# Patient Record
Sex: MALE | Race: WHITE
[De-identification: names, ages, dates, MRNs, and addresses within clinical notes are randomized per-mention and may not be internally consistent; named-entity substitution may affect disease eponyms.]

---

## 2018-11-12 ENCOUNTER — HOSPITAL ENCOUNTER (OUTPATIENT)
Dept: HOSPITAL 47 - LABPAT | Age: 38
End: 2018-11-12
Attending: ORTHOPAEDIC SURGERY
Payer: COMMERCIAL

## 2018-11-12 DIAGNOSIS — Z01.812: Primary | ICD-10-CM

## 2018-11-12 PROCEDURE — 86901 BLOOD TYPING SEROLOGIC RH(D): CPT

## 2018-11-12 PROCEDURE — 87070 CULTURE OTHR SPECIMN AEROBIC: CPT

## 2018-11-12 PROCEDURE — 86900 BLOOD TYPING SEROLOGIC ABO: CPT

## 2018-11-12 PROCEDURE — 86850 RBC ANTIBODY SCREEN: CPT

## 2018-11-18 NOTE — HP
HISTORY AND PHYSICAL



SURGERY:

11/19/2018



Clyde Martinez is a 37-year-old patient seen with symptomatic left hip osteoarthritis.

After treatment options were discussed with him, he elected to proceed with left total

hip arthroplasty.  Consent regarding the procedure was obtained.  Medical clearance was

provided by Dr. Weber.



PAST MEDICAL HISTORY:

Hypertension, non-insulin-dependent diabetes.



PAST SURGICAL HISTORY:

Left hip arthroscopy.



MEDICATIONS:

Glipizide, lisinopril, ibuprofen.



ALLERGIES:

None.



SOCIAL HISTORY:

The patient denies current tobacco use.



PHYSICAL EXAMINATION:

Evaluation of the left hip: He has very limited range of motion with severe pain.

Positive hip impingement sign.  Straight leg raise negative.  Distal neurovascular exam

is intact.



RADIOGRAPHS:

Radiographs of the left hip reveal severe osteoarthritic changes.



IMPRESSION:

1. Left hip osteoarthritis.

2. Hypertension.

3. Non-insulin-dependent diabetes.



PLAN:

Direct anterior left total hip arthroplasty.





MMODL / IJN: [id number] / Job#: [id number]

## 2018-11-19 ENCOUNTER — HOSPITAL ENCOUNTER (INPATIENT)
Dept: HOSPITAL 47 - 2ORMAIN | Age: 38
LOS: 1 days | Discharge: HOME HEALTH | DRG: 470 | End: 2018-11-20
Attending: ORTHOPAEDIC SURGERY | Admitting: ORTHOPAEDIC SURGERY
Payer: COMMERCIAL

## 2018-11-19 VITALS — BODY MASS INDEX: 43.4 KG/M2

## 2018-11-19 DIAGNOSIS — Z79.899: ICD-10-CM

## 2018-11-19 DIAGNOSIS — Z79.4: ICD-10-CM

## 2018-11-19 DIAGNOSIS — Z87.891: ICD-10-CM

## 2018-11-19 DIAGNOSIS — E66.01: ICD-10-CM

## 2018-11-19 DIAGNOSIS — G47.30: ICD-10-CM

## 2018-11-19 DIAGNOSIS — E11.9: ICD-10-CM

## 2018-11-19 DIAGNOSIS — E78.5: ICD-10-CM

## 2018-11-19 DIAGNOSIS — K21.9: ICD-10-CM

## 2018-11-19 DIAGNOSIS — Z99.89: ICD-10-CM

## 2018-11-19 DIAGNOSIS — Z86.69: ICD-10-CM

## 2018-11-19 DIAGNOSIS — I10: ICD-10-CM

## 2018-11-19 DIAGNOSIS — M16.12: Primary | ICD-10-CM

## 2018-11-19 DIAGNOSIS — E78.1: ICD-10-CM

## 2018-11-19 LAB
GLUCOSE BLD-MCNC: 109 MG/DL (ref 75–99)
GLUCOSE BLD-MCNC: 162 MG/DL (ref 75–99)
GLUCOSE BLD-MCNC: 181 MG/DL (ref 75–99)

## 2018-11-19 PROCEDURE — 86900 BLOOD TYPING SEROLOGIC ABO: CPT

## 2018-11-19 PROCEDURE — 86901 BLOOD TYPING SEROLOGIC RH(D): CPT

## 2018-11-19 PROCEDURE — 86850 RBC ANTIBODY SCREEN: CPT

## 2018-11-19 PROCEDURE — 73501 X-RAY EXAM HIP UNI 1 VIEW: CPT

## 2018-11-19 PROCEDURE — 83036 HEMOGLOBIN GLYCOSYLATED A1C: CPT

## 2018-11-19 PROCEDURE — 0SRB04A REPLACEMENT OF LEFT HIP JOINT WITH CERAMIC ON POLYETHYLENE SYNTHETIC SUBSTITUTE, UNCEMENTED, OPEN APPROACH: ICD-10-PCS

## 2018-11-19 PROCEDURE — 88300 SURGICAL PATH GROSS: CPT

## 2018-11-19 PROCEDURE — 80053 COMPREHEN METABOLIC PANEL: CPT

## 2018-11-19 PROCEDURE — 85025 COMPLETE CBC W/AUTO DIFF WBC: CPT

## 2018-11-19 RX ADMIN — POTASSIUM CHLORIDE SCH MLS: 14.9 INJECTION, SOLUTION INTRAVENOUS at 08:50

## 2018-11-19 RX ADMIN — HYDROMORPHONE HYDROCHLORIDE PRN MG: 1 INJECTION, SOLUTION INTRAMUSCULAR; INTRAVENOUS; SUBCUTANEOUS at 18:36

## 2018-11-19 RX ADMIN — HYDROCODONE BITARTRATE AND ACETAMINOPHEN PRN EACH: 7.5; 325 TABLET ORAL at 14:20

## 2018-11-19 RX ADMIN — HYDROMORPHONE HYDROCHLORIDE PRN MG: 1 INJECTION, SOLUTION INTRAMUSCULAR; INTRAVENOUS; SUBCUTANEOUS at 15:07

## 2018-11-19 RX ADMIN — HYDROCODONE BITARTRATE AND ACETAMINOPHEN PRN EACH: 7.5; 325 TABLET ORAL at 21:04

## 2018-11-19 RX ADMIN — POTASSIUM CHLORIDE SCH: 14.9 INJECTION, SOLUTION INTRAVENOUS at 14:47

## 2018-11-19 RX ADMIN — INSULIN ASPART SCH UNIT: 100 INJECTION, SOLUTION INTRAVENOUS; SUBCUTANEOUS at 17:43

## 2018-11-19 NOTE — P.CONS
History of Present Illness





- Reason for Consult


Consult date: 11/19/18


Medical management


Requesting physician: Antoine Prabhakar





- Chief Complaint


Left hip total arthroplasty





- History of Present Illness





This is a 37-year-old male, patient of Dr. Weber.  He has a known past 

medical history of diabetes, hypertension, arthritis or arthritis and acid 

reflux.  Patient presents to the hospital for an elective left total hip 

arthroplasty.  He tolerated surgery well no complications reported.  Estimated 

blood loss 300 mL.  We will been consulted for medical management.  Patient 

lying in bed comfortably.  Denies any chest pain or shortness of breath.  

Denies any nausea or vomiting.  Denies any bowel movement changes or urinary 

symptoms.  Denies any fever or chills or sweats.  He is complaining of some 

left hip pain and is due for pain medication.





Review of Systems





Please refer to HPI otherwise unremarkable





Past Medical History


Past Medical History: Diabetes Mellitus, GERD/Reflux, Hyperlipidemia, 

Osteoarthritis (OA), Sleep Apnea/CPAP/BIPAP


Additional Past Medical History / Comment(s): Hx. Bell's Palsy July 2016-no 

residual, doesn't use anything for sleep apnea


History of Any Multi-Drug Resistant Organisms: None Reported


Past Surgical History: Orthopedic Surgery


Additional Past Surgical History / Comment(s): vasectomy 9-2016,rt achilles 

tendon repair, arthroscopy left hip


Past Anesthesia/Blood Transfusion Reactions: Motion Sickness


Additional Past Anesthesia/Blood Transfusion Reaction / Comm: no hx blood 

transfusion


Smoking Status: Former smoker





- Past Family History


  ** Mother


Family Medical History: No Reported History





  ** Father


Family Medical History: No Reported History





Medications and Allergies


 Home Medications











 Medication  Instructions  Recorded  Confirmed  Type


 


DULoxetine HCL [Cymbalta] 20 mg PO HS 11/14/18 11/19/18 History


 


Ibuprofen [Motrin] 800 mg PO Q6H PRN 11/14/18 11/19/18 History


 


Insulin Degludec/Liraglutide 26 unit SQ QAM 11/14/18 11/19/18 History





[Xultophy 100 Unit-3.6MG/ml Pen]    


 


Lisinopril [Zestril] 10 mg PO DAILY 11/14/18 11/19/18 History


 


Omeprazole [PriLOSEC] 20 mg PO AC-BRKFST PRN 11/14/18 11/19/18 History


 


Simvastatin [Zocor] 20 mg PO HS 11/14/18 11/19/18 History


 


glipiZIDE [Glucotrol] 5 mg PO AC-BID 11/14/18 11/19/18 History











 Allergies











Allergy/AdvReac Type Severity Reaction Status Date / Time


 


No Known Allergies Allergy   Verified 11/19/18 13:26














Physical Exam


Vitals: 


 Vital Signs











  Temp Pulse Pulse Resp BP Pulse Ox


 


 11/19/18 13:30   62   18  119/60  94 L


 


 11/19/18 13:15   57 L   16  118/59  98


 


 11/19/18 13:00   62   18  114/69  94 L


 


 11/19/18 12:45   70   18  115/62  93 L


 


 11/19/18 12:32  97.7 F  63   18  125/67  97


 


 11/19/18 08:29  97.4 F L   71  16  127/74  98








 Intake and Output











 11/18/18 11/19/18 11/19/18





 22:59 06:59 14:59


 


Intake Total   1601


 


Output Total   300


 


Balance   1301


 


Intake:   


 


  IV   1601


 


Output:   


 


  Estimated Blood Loss   300














Head normocephalic


Neck supple


Lungs clear to auscultation bilaterally no wheezing or crackles


Heart regular rate and rhythm S1-S2, no rub or gallop


Abdomen is soft nontender nondistended positive bowel sounds no 

hepatosplenomegaly


Extremities no edema.  Left hip dressing clean dry and intact.  Ice pack in 

place ZACH hose and SCDs


Neuro alert and orientated to 3





Results


Labs: 


 Abnormal Lab Results - Last 24 Hours (Table)











  11/19/18 11/19/18 Range/Units





  08:45 13:32 


 


POC Glucose (mg/dL)  109 H  162 H  (75-99)  mg/dL














Assessment and Plan


Assessment: 





1.  Left hip osteoarthritis status post direct anterior left total hip 

arthroplasty.  Estimated bolus 300 mL.  Continue pain medication for PE 

protocol.  Patient currently on Lovenox for DVT prophylaxis





2.  Diabetes mellitus type 2: Continue glipizide.  Resume xultophy.  This is a 

nonformulary medication.  Patient can't bring from home.  We'll add NovoLog 

sliding scale coverage.  Check A1c





3.  Essential hypertension: Continue lisinopril 10 mg daily hold for systolic 

blood pressure less than 120





4.  GERD: Continue Pepcid





Thank you for this consultation.  We will continue to follow along during 

patient's hospitalization.  Check routine labs in the morning CBC and CMP








Time with Patient: Greater than 30 (Greater than 60% of the total time spent in 

counseling and coordination of care.I performed an examination of the patient 

and discussed their management with the physician Assistant.  I have reviewed 

the Physician Assistant's notes and agree with the documented findings and plan 

of care)

## 2018-11-19 NOTE — XR
EXAMINATION TYPE: 

 

XR Hip Limited LT, 

FL guidance operating room

 

DATE OF EXAM: 11/19/2018

 

FINDINGS:

Single frontal view showing appearance after left hip total arthroplasty.

 

FLUOROSCOPY

 

Fluoroscopy time of 38 seconds was used during anterior left hip replacement.  1 image/s document/s t
he procedure.

 

 

 

IMPRESSION:

Intraoperative fluoroscopy as above.

## 2018-11-19 NOTE — P.OP
Date of Procedure: 11/19/18


Preoperative Diagnosis: 


Left hip osteoarthritis


Postoperative Diagnosis: 


Left hip osteoarthritis


Procedure(s) Performed: 


Direct anterior left total hip arthroplasty


Implants: 


1.  Depuy Corail KA size 12 with collar press-fit femoral stem


2.  Depuy pinnacle 60 mm press-fit acetabular shell


3.  Depuy pinnacle polyethylene acetabular liner 60 mm OD 36 mm ID neutral


4.  Biolox delta ceramic femoral head 36 mm +5





Anesthesia: local, spinal


Surgeon: Antoine Prabhakar


Assistant #1: Lance Ray


Estimated Blood Loss (ml): 300


Pathology: other (Femoral head)


Condition: stable


Disposition: PACU


Indications for Procedure: 


37-year-old patient seen with symptomatic left hip osteoarthritis.  After 

treatment options were discussed, he elected to proceed with total hip 

arthroplasty.


Operative Findings: 


See description of procedure


Description of Procedure: 


The patient was taken to the operative suite.  Patient underwent a spinal 

anesthetic by the department of anesthesia.  Patient was then transferred to 

the [place] table.  Patient was given preoperative IV antibiotics and TXA.  Both 

lower extremities were placed in standard leg spars.  The hip was then prepped 

and draped in the normal sterile orthopedic fashion.  A standard anterior 

incision was made beginning 3 cm lateral and 1 cm distal to the ASIS extending 

10 cm.  Dissection was then carried down through the subcutaneous soft tissues 

down to the fascia overlying the tensor fascia lashell.  An incision was now made 

through the fascia.  Careful dissection was taken down exposing the tensor 

fascia lashell muscle.  A Cobra retractor was now placed along the medial femoral 

neck and a second one along the lateral femoral neck.  The venous circumflex 

vessels were now identified, cauterized and clipped.  We identified the 

anterior hip capsule.  An incision was made through the hip capsule along the 

lateral border.  I performed a partial anterior capsulectomy.  Retractors were 

now placed around the femoral neck itself. A femoral neck cut was now made with 

a sagittal saw.  It was completed with an osteotome at the lateral neck area.  

The femoral head was now removed without difficulty.  The extremity was now 

rotated to 45 of external rotation.  It was locked in position.  Residual 

labrum was now debrided out.  Serial reaming was performed of the acetabulum 

while Osito ZAPATA assisted holding an anterior retractor for exposure.  Once 

we reached the appropriate size and a trial was position and fit nicely.  The 

appropriate size was now chosen opened and made available.  It was introduced 

into the acetabulum without difficulty.  The C-arm/fluoroscopy was now brought 

into the operative field.  We made sure we had a true AP pelvic view.  We now 

under direct C-arm/fluoroscopy introduced into the acetabular component with 

appropriate version and inclination.  I held the cup in appropriate position 

well Osito ZAPATA used a mallet to seat the acetabular component.  I noted 

the component now to be well seated and stable.  Acetabular cup introducer was 

removed.  The C-arm was pulled back.  An appropriate liner was introduced and 

clicked into position.  It was felt to be stable.  At this point retractors 

were removed.  The extremity was now placed into 120 external rotation with no 

traction.  The leg was now dropped to the ground and adducted.  Appropriate 

retractors were now positioned along the proximal femur.  We also placed our 

femoral look into position.  Additional capsular releasing was performed to 

gain access to the proximal femur.  We now used a box osteotome.  A canal 

finder was now utilized.  Serial broaching was now performed with the 

assistance of Osito ZAPATA tapping the broaches down with a mallet while held 

the broach in appropriate rotation and position.  This was done  until we 

reached the appropriate size with good overall rotational stability.  

Appropriate calcar planing was performed.  A trial head/neck was placed into 

position.  The hip was now reduced.  The C-arm/fluoroscopy was brought back 

into the operative field.  A spot film was obtained of the nonoperative hip.  A 

spot film was obtained of the trial components.  Overlays were performed, we 

noted good overall alignment and positioning for determining leg length.  The C-

arm/fluoroscopy was pulled back.  Retractors were repositioned and the hip was 

dislocated.  The leg was again taken down to the ground and adducted.  

Appropriate retractors were repositioned as well as the femoral hook.  All 

trial components were removed.  The femoral implant was opened along with the 

femoral head.  The femoral implant was introduced on the appropriate handle 

into our pre-broached area.  I held the component position well Osito ZAPATA 

used a mallet to seat the femoral component.  The femoral component was now 

noted to be well seated and stable..  The femoral head was introduced with good 

positioning and fixation noted.  Retractors were now removed.  The hip was now 

reduced.  There appeared be good positioning of the hip confirmed on 

intraoperative fluoroscopy.  Spot films were obtained to document this.  A 

second gram of TXA was given.  The deep and superficial soft tissues were 

infiltrated with local analgesic.  Bipolar cautery had been utilized 

intermittently through the procedure for hemostasis.  The wound was irrigated 

copiously with pulse lavage mechanical irrigation.  The fascia was repaired 

with Vicryl suture.  The subcutaneous soft tissues were repaired in layers with 

Vicryl suture.  The skin was approximated with pernio/Dermabond. Sterile 

dressings were applied.  Patient was then awakened, transferred to a bed and 

taken to recovery in stable condition.  Osito ZAPATA assisted with the 

complex procedure.

## 2018-11-20 VITALS — TEMPERATURE: 98 F

## 2018-11-20 VITALS — RESPIRATION RATE: 16 BRPM | DIASTOLIC BLOOD PRESSURE: 60 MMHG | HEART RATE: 76 BPM | SYSTOLIC BLOOD PRESSURE: 131 MMHG

## 2018-11-20 LAB
ALBUMIN SERPL-MCNC: 3.3 G/DL (ref 3.5–5)
ALP SERPL-CCNC: 26 U/L (ref 38–126)
ALT SERPL-CCNC: 49 U/L (ref 21–72)
ANION GAP SERPL CALC-SCNC: 7 MMOL/L
AST SERPL-CCNC: 34 U/L (ref 17–59)
BASOPHILS # BLD AUTO: 0 K/UL (ref 0–0.2)
BASOPHILS NFR BLD AUTO: 0 %
BUN SERPL-SCNC: 12 MG/DL (ref 9–20)
CALCIUM SPEC-MCNC: 8.3 MG/DL (ref 8.4–10.2)
CHLORIDE SERPL-SCNC: 105 MMOL/L (ref 98–107)
CO2 SERPL-SCNC: 25 MMOL/L (ref 22–30)
EOSINOPHIL # BLD AUTO: 0.1 K/UL (ref 0–0.7)
EOSINOPHIL NFR BLD AUTO: 1 %
ERYTHROCYTE [DISTWIDTH] IN BLOOD BY AUTOMATED COUNT: 4.04 M/UL (ref 4.3–5.9)
ERYTHROCYTE [DISTWIDTH] IN BLOOD: 12.3 % (ref 11.5–15.5)
GLUCOSE BLD-MCNC: 109 MG/DL (ref 75–99)
GLUCOSE BLD-MCNC: 122 MG/DL (ref 75–99)
GLUCOSE BLD-MCNC: 140 MG/DL (ref 75–99)
GLUCOSE SERPL-MCNC: 113 MG/DL (ref 74–99)
HBA1C MFR BLD: 6.2 % (ref 4–6)
HCT VFR BLD AUTO: 36.5 % (ref 39–53)
HGB BLD-MCNC: 12.3 GM/DL (ref 13–17.5)
LYMPHOCYTES # SPEC AUTO: 1.6 K/UL (ref 1–4.8)
LYMPHOCYTES NFR SPEC AUTO: 16 %
MCH RBC QN AUTO: 30.4 PG (ref 25–35)
MCHC RBC AUTO-ENTMCNC: 33.6 G/DL (ref 31–37)
MCV RBC AUTO: 90.4 FL (ref 80–100)
MONOCYTES # BLD AUTO: 0.7 K/UL (ref 0–1)
MONOCYTES NFR BLD AUTO: 7 %
NEUTROPHILS # BLD AUTO: 7.3 K/UL (ref 1.3–7.7)
NEUTROPHILS NFR BLD AUTO: 74 %
PLATELET # BLD AUTO: 287 K/UL (ref 150–450)
POTASSIUM SERPL-SCNC: 4.3 MMOL/L (ref 3.5–5.1)
PROT SERPL-MCNC: 5.8 G/DL (ref 6.3–8.2)
SODIUM SERPL-SCNC: 137 MMOL/L (ref 137–145)
WBC # BLD AUTO: 9.9 K/UL (ref 3.8–10.6)

## 2018-11-20 RX ADMIN — HYDROCODONE BITARTRATE AND ACETAMINOPHEN PRN EACH: 7.5; 325 TABLET ORAL at 14:13

## 2018-11-20 RX ADMIN — INSULIN ASPART SCH: 100 INJECTION, SOLUTION INTRAVENOUS; SUBCUTANEOUS at 00:40

## 2018-11-20 RX ADMIN — HYDROCODONE BITARTRATE AND ACETAMINOPHEN PRN EACH: 7.5; 325 TABLET ORAL at 09:10

## 2018-11-20 RX ADMIN — POTASSIUM CHLORIDE SCH: 14.9 INJECTION, SOLUTION INTRAVENOUS at 07:10

## 2018-11-20 RX ADMIN — POTASSIUM CHLORIDE SCH: 14.9 INJECTION, SOLUTION INTRAVENOUS at 12:11

## 2018-11-20 RX ADMIN — INSULIN ASPART SCH: 100 INJECTION, SOLUTION INTRAVENOUS; SUBCUTANEOUS at 08:19

## 2018-11-20 RX ADMIN — INSULIN ASPART SCH: 100 INJECTION, SOLUTION INTRAVENOUS; SUBCUTANEOUS at 12:11

## 2018-11-20 RX ADMIN — POTASSIUM CHLORIDE SCH: 14.9 INJECTION, SOLUTION INTRAVENOUS at 03:48

## 2018-11-20 RX ADMIN — HYDROCODONE BITARTRATE AND ACETAMINOPHEN PRN EACH: 7.5; 325 TABLET ORAL at 03:50

## 2018-11-20 NOTE — P.DS
Providers


Date of admission: 


11/19/18 08:07





Expected date of discharge: 11/20/18


Attending physician: 


Antoine Prabhakar





Consults: 





 





11/19/18 12:13


Consult Physician Routine 


   Consulting Provider: Traci Weber


   Consult Reason/Comments: Medical management


   Do you want consulting provider notified?: Yes





11/19/18 12:57


Consult Physician Routine 


   Consulting Provider: Yuko Kuhn


   Consult Reason/Comments: medical management


   Do you want consulting provider notified?: Yes











Primary care physician: 


Farzaneh Weber





[facility] Course: 





Date of admission: 11/19/2018


Date of discharge: 11/20/2018





Admission diagnosis: Status post left total hip arthroplasty


Discharge diagnosis: Same





Attending physician: Dr. Prabhakar





Surgical procedures: Left total hip arthroplasty





Brief history: Patient is a 37-year-old male with a history of progressive 

primary left hip osteoarthritis.  At this point patient has failed conservative 

treatment measures and has opted to proceed with a elective left total hip 

arthroplasty.





Hospital course: Details of patient's surgery can be found in operative report.

  Patient tolerated the procedure well and was subsequently transported to 

orthopedic floor.  Patient's orthopeidc and medical care was provided daily. 

Patient had daily laboratory tests performed for evaluation of overall blood 

counts.  Patient had daily physical therapy to include strengthening range of 

motion as well as education with walker ambulation. Patient was treated with 

Lovenox for their postoperative DVT prophylaxis during their inpatient stay.  

Patient was noted to have a relatively uneventful postoperative course.  

Patient reported satisfactory pain control with oral pain medications by 

postoperative day 0.  Patient showed satisfactory progress with physical 

therapy.  Patient moved steadily through the program and had no difficulty 

meeting the goals by postoperative day 1.  Given patient's otherwise 

satisfactory course and having met physical therapy goals, plan is to discharge 

patient home on postoperative day 1.





Discharge condition/disposition: Patient will be discharged home in stable 

condition.





Discharge medications: Instructions are given on resumption of patient's normal 

daily medications per primary care recommendation, in addition patient will be 

prescribed Norco 7.5 mg/325 mg, tramadol 50 mg, Colace 100 mg, aspirin 81 mg.





Discharge instructions:


1.  Wound care and infection precautions, keep incision dry and covered while 

showering, no lotions, creams, moisturizers. No soaking, tubs, pools, hottubs. 

Do not scrub over the incision.


2.  Weight-bear as tolerated with walker / cane until follow-up.


3.  Ice and elevate when necessary.  Do not exceed 20 minutes per hour with ice 

pack.


4.  Utilize compression sleeve until seen at first follow up appointment.


5.  Visiting nursing care.


6.  Home physical therapy.


7.  Pain meds and anticoagulants per prescription.


8.  Pain medication has potential to cause constipation. Increase oral fluid 

and fiber intake. Contact primary care provider if you have not had a bowel 

movement within 48 hours after discharge


9.  No anti-inflammatory medication until discussed at first post operative 

visit, this including Motrin, Aleve, Mobic, Diclofenac.


10.  Follow up in office at 2 weeks postop with Osito Ray PA-C


11. Follow up with your primary care doctor 7-10 days after discharge.


12. Contact [facility] with any questions, [phone].











Procedures: 





Left total hip arthroplasty


Patient Condition at Discharge: Good





Plan - Discharge Summary


Discharge Rx Participant: No


New Discharge Prescriptions: 


New


   Aspirin [Adult Low Dose Aspirin EC] 81 mg PO BID #60 tablet.


   Docusate [Colace] 100 mg PO DAILY #30 capsule


   HYDROcodone/APAP 7.5-325MG [Norco 7.5] 1 - 2 each PO Q6HR PRN #40 tab


     PRN Reason: Pain


   traMADol HCl [Ultram] 50 mg PO Q6H PRN #28 tab


     PRN Reason: Pain





No Action


   Omeprazole [PriLOSEC] 20 mg PO AC-BRKFST PRN


     PRN Reason: Heartburn


   glipiZIDE [Glucotrol] 5 mg PO AC-BID


   Simvastatin [Zocor] 20 mg PO HS


   Lisinopril [Zestril] 10 mg PO DAILY


   DULoxetine HCL [Cymbalta] 20 mg PO HS


   Insulin Degludec/Liraglutide [Xultophy 100 Unit-3.6MG/ml Pen] 26 unit SQ QAM


   Ibuprofen [Motrin] 800 mg PO Q6H PRN


     PRN Reason: Pain


Discharge Medication List





DULoxetine HCL [Cymbalta] 20 mg PO HS 11/14/18 [History]


Ibuprofen [Motrin] 800 mg PO Q6H PRN 11/14/18 [History]


Insulin Degludec/Liraglutide [Xultophy 100 Unit-3.6MG/ml Pen] 26 unit SQ QAM 11/ 14/18 [History]


Lisinopril [Zestril] 10 mg PO DAILY 11/14/18 [History]


Omeprazole [PriLOSEC] 20 mg PO AC-BRKFST PRN 11/14/18 [History]


Simvastatin [Zocor] 20 mg PO HS 11/14/18 [History]


glipiZIDE [Glucotrol] 5 mg PO AC-BID 11/14/18 [History]


Aspirin [Adult Low Dose Aspirin EC] 81 mg PO BID #60 tablet.dr 11/20/18 [Rx]


Docusate [Colace] 100 mg PO DAILY #30 capsule 11/20/18 [Rx]


HYDROcodone/APAP 7.5-325MG [Norco 7.5] 1 - 2 each PO Q6HR PRN #40 tab 11/20/18 [

Rx]


traMADol HCl [Ultram] 50 mg PO Q6H PRN #28 tab 11/20/18 [Rx]








Follow up Appointment(s)/Referral(s): 


Farzaneh Weber DO [Primary Care Provider] - 1 Week


[facility], [NON-STAFF] - 


Lance Ray PAC [PHYSICIAN ASSISTANT] - 12/05/18 1:50 pm


Activity/Diet/Wound Care/Special Instructions: 


Orthopedic Discharge Instructions:


1.  Wound care and infection precautions, keep incision dry and covered while 

showering, no lotions, creams, moisturizers. No soaking, pools, hot tubs. Do 

not scrub over incision.


2.  Weight-bear as tolerated with walker / cane until follow-up.


3.  Ice and elevate when necessary.  Do not exceed 20 minutes per hour with ice 

pack.


4.  Utilize compression sleeve until seen at first follow up appointment.


5.  Pain meds and anticoagulants per prescription.


6.  Pain medication has potential to cause constipation. Increase oral fluid 

and fiber intake. Contact primary care provider if you have not had a bowel 

movement within 48 hours after discharge.


7.  No anti-inflammatory medication until discussed at first post operative 

visit, this including Motrin, Aleve, Mobic, Diclofenac.


8. Follow up in office at 2 weeks postop with Osito Ray PA-C


9. Follow up with your primary care doctor 7-10 days after discharge.


10. Contact [facility] with any questions, [phone].





Discharge Disposition: HOME WITH HOME HEALTH SERVICES

## 2018-11-20 NOTE — P.PN
Subjective


Progress Note Date: 11/20/18


Principal diagnosis: 





Status post left total hip arthroplasty





Patient is seen today resting in his hospital bed, he appears comfortable.  

Patient's ambulate well with physical therapy.  He denies any chest pain or 

shortness breath.





Objective





- Vital Signs


Vital signs: 


 Vital Signs











Temp  98.0 F   11/20/18 07:10


 


Pulse  76   11/20/18 07:10


 


Resp  16   11/20/18 07:10


 


BP  131/60   11/20/18 07:10


 


Pulse Ox  94 L  11/20/18 07:10








 Intake & Output











 11/19/18 11/20/18 11/20/18





 18:59 06:59 18:59


 


Intake Total 1601 800 600


 


Output Total 300 900 


 


Balance 1301 -100 600


 


Weight 145.15 kg  


 


Intake:   


 


  IV 1601  


 


  Intake, IV Titration  800 





  Amount   


 


    Lactated Ringers 1,000 ml  800 





    @ 80 mls/hr IV .[id number]   





    XIMENA Rx#:[id number]   


 


  Oral   600


 


Output:   


 


  Urine  900 


 


  Estimated Blood Loss 300  


 


Other:   


 


  Voiding Method Toilet  Toilet


 


  # Voids  2 














- Exam





Left lower extremity:


Incision is clean, dry, and intact.  The exofin fusion tape is in good 

condition.  There is minimal soft tissue swelling and ecchymosis surrounding 

the medial and lateral aspects of the incision.  Calf is soft, no tenderness 

with palpation.  Plantar flexion, dorsiflexion, EHL, FHL are intact.  Sensory 

exam to light touch throughout the extremity is intact, dorsal pedis pulses 2+.








- Labs


CBC & Chem 7: 


 11/20/18 07:47





 11/20/18 07:47


Labs: 


 Abnormal Lab Results - Last 24 Hours (Table)











  11/19/18 11/19/18 11/20/18 Range/Units





  13:32 17:28 00:33 


 


RBC     (4.30-5.90)  m/uL


 


Hgb     (13.0-17.5)  gm/dL


 


Hct     (39.0-53.0)  %


 


Glucose     (74-99)  mg/dL


 


POC Glucose (mg/dL)  162 H  181 H  122 H  (75-99)  mg/dL


 


Calcium     (8.4-10.2)  mg/dL


 


Alkaline Phosphatase     ()  U/L


 


Total Protein     (6.3-8.2)  g/dL


 


Albumin     (3.5-5.0)  g/dL














  11/20/18 11/20/18 11/20/18 Range/Units





  07:41 07:47 07:47 


 


RBC   4.04 L   (4.30-5.90)  m/uL


 


Hgb   12.3 L   (13.0-17.5)  gm/dL


 


Hct   36.5 L   (39.0-53.0)  %


 


Glucose    113 H  (74-99)  mg/dL


 


POC Glucose (mg/dL)  140 H    (75-99)  mg/dL


 


Calcium    8.3 L  (8.4-10.2)  mg/dL


 


Alkaline Phosphatase    26 L  ()  U/L


 


Total Protein    5.8 L  (6.3-8.2)  g/dL


 


Albumin    3.3 L  (3.5-5.0)  g/dL














  11/20/18 Range/Units





  12:01 


 


RBC   (4.30-5.90)  m/uL


 


Hgb   (13.0-17.5)  gm/dL


 


Hct   (39.0-53.0)  %


 


Glucose   (74-99)  mg/dL


 


POC Glucose (mg/dL)  109 H  (75-99)  mg/dL


 


Calcium   (8.4-10.2)  mg/dL


 


Alkaline Phosphatase   ()  U/L


 


Total Protein   (6.3-8.2)  g/dL


 


Albumin   (3.5-5.0)  g/dL














Assessment and Plan


Plan: 





Assessment:


Postoperative day #1 status post left total hip arthroplasty





Plan:


Pain control, we'll discharge home on oral medications





GI and DVT prophylaxis, aspirin 81 mg twice a day for 1 month





Home physical therapy and nursing after discharge





Wound care was discussed the patient at bedside





Medical recommendations





Discharge planning: Plan for discharge home today





Time with Patient: Less than 30

## 2018-11-20 NOTE — P.PN
Subjective


Progress Note Date: 11/20/18





This is a 37-year-old male, patient of Dr. Weber.  He has a known past 

medical history of diabetes, hypertension, arthritis or arthritis and acid 

reflux.  Patient presents to the hospital for an elective left total hip 

arthroplasty.  He tolerated surgery well no complications reported.  Estimated 

blood loss 300 mL.  We will been consulted for medical management.  Patient 

lying in bed comfortably.  Denies any chest pain or shortness of breath.  

Denies any nausea or vomiting.  Denies any bowel movement changes or urinary 

symptoms.  Denies any fever or chills or sweats.  He is complaining of some 

left hip pain and is due for pain medication.





11/20/2018 patient's pain is controlled.  He has been up and ambulating.  They'

re anticipating discharge later this afternoon.  Denies any chest pain or 

shortness breath.  Denies any nausea or vomiting.  Denies any bowel movement 

changes or urinary symptoms.





Objective





- Vital Signs


Vital signs: 


 Vital Signs











Temp  98.0 F   11/20/18 07:10


 


Pulse  76   11/20/18 07:10


 


Resp  16   11/20/18 07:10


 


BP  131/60   11/20/18 07:10


 


Pulse Ox  94 L  11/20/18 07:10








 Intake & Output











 11/19/18 11/20/18 11/20/18





 18:59 06:59 18:59


 


Intake Total 1601 800 600


 


Output Total 300 900 


 


Balance 1301 -100 600


 


Weight 145.15 kg  


 


Intake:   


 


  IV 1601  


 


  Intake, IV Titration  800 





  Amount   


 


    Lactated Ringers 1,000 ml  800 





    @ 80 mls/hr IV .[id number]   





    [facility] Rx#:[id number]   


 


  Oral   600


 


Output:   


 


  Urine  900 


 


  Estimated Blood Loss 300  


 


Other:   


 


  Voiding Method Toilet  Toilet


 


  # Voids  2 














- Exam





Head normocephalic


Neck supple


Lungs clear to auscultation bilaterally no wheezing or crackles


Heart regular rate and rhythm S1-S2, no rub or gallop


Abdomen is soft nontender nondistended positive bowel sounds no 

hepatosplenomegaly


Extremities no edema


Neuro alert and orientated to 3





- Labs


CBC & Chem 7: 


 11/20/18 07:47





 11/20/18 07:47


Labs: 


 Abnormal Lab Results - Last 24 Hours (Table)











  11/19/18 11/19/18 11/20/18 Range/Units





  13:32 17:28 00:33 


 


RBC     (4.30-5.90)  m/uL


 


Hgb     (13.0-17.5)  gm/dL


 


Hct     (39.0-53.0)  %


 


Glucose     (74-99)  mg/dL


 


POC Glucose (mg/dL)  162 H  181 H  122 H  (75-99)  mg/dL


 


Calcium     (8.4-10.2)  mg/dL


 


Alkaline Phosphatase     ()  U/L


 


Total Protein     (6.3-8.2)  g/dL


 


Albumin     (3.5-5.0)  g/dL














  11/20/18 11/20/18 11/20/18 Range/Units





  07:41 07:47 07:47 


 


RBC   4.04 L   (4.30-5.90)  m/uL


 


Hgb   12.3 L   (13.0-17.5)  gm/dL


 


Hct   36.5 L   (39.0-53.0)  %


 


Glucose    113 H  (74-99)  mg/dL


 


POC Glucose (mg/dL)  140 H    (75-99)  mg/dL


 


Calcium    8.3 L  (8.4-10.2)  mg/dL


 


Alkaline Phosphatase    26 L  ()  U/L


 


Total Protein    5.8 L  (6.3-8.2)  g/dL


 


Albumin    3.3 L  (3.5-5.0)  g/dL














  11/20/18 Range/Units





  12:01 


 


RBC   (4.30-5.90)  m/uL


 


Hgb   (13.0-17.5)  gm/dL


 


Hct   (39.0-53.0)  %


 


Glucose   (74-99)  mg/dL


 


POC Glucose (mg/dL)  109 H  (75-99)  mg/dL


 


Calcium   (8.4-10.2)  mg/dL


 


Alkaline Phosphatase   ()  U/L


 


Total Protein   (6.3-8.2)  g/dL


 


Albumin   (3.5-5.0)  g/dL














Assessment and Plan


Assessment: 





1.  Left hip osteoarthritis status post direct anterior left total hip 

arthroplasty.  Estimated bolus 300 mL.  Continue pain medication per 

orthopedics.  Patient currently on Lovenox for DVT prophylaxis





2.  Diabetes mellitus type 2: Continue glipizide.  Resume xultophy.  This is a 

nonformulary medication.  Patient can't bring from home.  We'll add NovoLog 

sliding scale coverage.  Check A1c





3.  Essential hypertension: Continue lisinopril 10 mg daily hold for systolic 

blood pressure less than 120





4.  GERD: Continue Pepcid





Patient's medication and lab results have been reviewed.  Patient is medically 

stable for discharge when cleared by orthopedics.  We'll have him follow up 

with his PCP, Dr. Weber in 1 week





I performed an examination of the patient and discussed their management with 

the physician Assistant.  I have reviewed the Physician Assistant's notes and 

agree with the documented findings and plan of care

## 2019-07-30 ENCOUNTER — HOSPITAL ENCOUNTER (OUTPATIENT)
Dept: HOSPITAL 47 - RADUSWWP | Age: 39
Discharge: HOME | End: 2019-07-30
Attending: THORACIC SURGERY (CARDIOTHORACIC VASCULAR SURGERY)
Payer: COMMERCIAL

## 2019-07-30 DIAGNOSIS — M79.662: Primary | ICD-10-CM

## 2019-07-30 NOTE — US
EXAMINATION TYPE: US venous doppler duplex LE LT

 

DATE OF EXAM: 7/30/2019 10:11 AM

 

COMPARISON: NONE

 

CLINICAL HISTORY: M79.605 Pain in Left leg. Pain in calf.

 

SIDE PERFORMED: Left  

 

TECHNIQUE:  The lower extremity deep venous system is examined utilizing real time linear array sonog
sally with graded compression, doppler sonography and color-flow sonography.

 

VESSELS IMAGED:

External Iliac Vein (EIV)

Common Femoral Vein

Deep Femoral Vein

Greater Saphenous Vein *

Femoral Vein

Popliteal Vein

Small Saphenous Vein *

Proximal Calf Veins

(* superficial vessels)

 

 Grayscale, color doppler, spectral doppler imaging performed of the deep veins of the left lower ext
remity.  There is normal flow, compressibility, vascular waveforms. 

 

Left Leg:  Negative for DVT

 

Small elongated area of fluid seen posterior calf area of pain.

 

IMPRESSION: 

1. No sonographic evidence of deep venous thrombosis within the left lower extremity. 

2. Two images provided in the area of patient's pain of the left calf demonstrate an avascular elonga
ge fluid collection. Considerations are for hematoma, seroma, along dated popliteal cyst, or less li
sanjay solid mass. This could be further evaluated with MRI if clinically indicated.